# Patient Record
Sex: MALE | Race: BLACK OR AFRICAN AMERICAN | Employment: FULL TIME | URBAN - METROPOLITAN AREA
[De-identification: names, ages, dates, MRNs, and addresses within clinical notes are randomized per-mention and may not be internally consistent; named-entity substitution may affect disease eponyms.]

---

## 2022-12-06 ENCOUNTER — HOSPITAL ENCOUNTER (EMERGENCY)
Age: 22
Discharge: HOME | End: 2022-12-06
Attending: EMERGENCY MEDICINE
Payer: MEDICAID

## 2022-12-06 VITALS
SYSTOLIC BLOOD PRESSURE: 152 MMHG | HEIGHT: 73 IN | WEIGHT: 200 LBS | DIASTOLIC BLOOD PRESSURE: 96 MMHG | TEMPERATURE: 98.3 F | HEART RATE: 67 BPM | BODY MASS INDEX: 26.51 KG/M2 | OXYGEN SATURATION: 97 % | RESPIRATION RATE: 17 BRPM

## 2022-12-06 DIAGNOSIS — U07.1 COVID-19: Primary | ICD-10-CM

## 2022-12-06 LAB
FLUAV AG NPH QL IA: NEGATIVE
FLUBV AG NPH QL IA: NEGATIVE
SARS-COV-2 RDRP RESP QL NAA+PROBE: DETECTED
SOURCE: ABNORMAL
SPECIMEN SOURCE: NORMAL

## 2022-12-06 PROCEDURE — 87804 INFLUENZA ASSAY W/OPTIC: CPT

## 2022-12-06 PROCEDURE — 87635 SARS-COV-2 COVID-19 AMP PRB: CPT

## 2022-12-06 PROCEDURE — 99283 EMERGENCY DEPT VISIT LOW MDM: CPT | Performed by: EMERGENCY MEDICINE

## 2022-12-06 RX ORDER — ONDANSETRON 4 MG/1
4 TABLET, ORALLY DISINTEGRATING ORAL 3 TIMES DAILY PRN
Qty: 9 TABLET | Refills: 0 | Status: SHIPPED | OUTPATIENT
Start: 2022-12-06 | End: 2022-12-09

## 2022-12-06 RX ORDER — IBUPROFEN 600 MG/1
600 TABLET ORAL 3 TIMES DAILY
Qty: 15 TABLET | Refills: 0 | Status: SHIPPED | OUTPATIENT
Start: 2022-12-06 | End: 2022-12-11

## 2022-12-06 ASSESSMENT — PAIN DESCRIPTION - LOCATION: LOCATION: THROAT;HEAD

## 2022-12-06 ASSESSMENT — PAIN - FUNCTIONAL ASSESSMENT: PAIN_FUNCTIONAL_ASSESSMENT: 0-10

## 2022-12-06 ASSESSMENT — PAIN SCALES - GENERAL: PAINLEVEL_OUTOF10: 6

## 2022-12-06 NOTE — ED NOTES
I have reviewed discharge instructions with the patient. The patient and spouse verbalized understanding. Patient left ED via Discharge Method: ambulatory to Home with girlfriend     Opportunity for questions and clarification provided. Patient given 3 scripts. To continue your aftercare when you leave the hospital, you may receive an automated call from our care team to check in on how you are doing. This is a free service and part of our promise to provide the best care and service to meet your aftercare needs.  If you have questions, or wish to unsubscribe from this service please call [phone]. Thank you for Choosing our [facility] Emergency Department.         Miguel Pantoja RN  12/06/22 [id number]

## 2022-12-06 NOTE — ED PROVIDER NOTES
Emergency Department Provider Note                   PCP:                No primary care provider on file. Age: 25 y.o. Sex: male     Final diagnosis/impression:  1. COVID-19         Disposition: Discharge    MDM/Clinical Course:  Patient seen by myself here in the [place] [address] emergency department. Patient had signs, symptoms and clinical history most consistent with viral type symptoms as previously described, well-appearing. Labs and radiology ordered, notable for COVID-19 viral infection. Outpatient prescriptions for Paxil bid given lack of previous vaccination, Zofran ODT, ibuprofen written. Recommended over-the-counter use of guaifenesin, Tylenol as needed, aggressive p.o. fluid hydration, close monitoring of symptoms, self-isolation. Patient/family given instructions to return as needed for any questions, concerns or worsening symptoms, particularly those as outlined in the disposition section / discharge section of patient discharge paperwork. Patient/family verbalizes understanding agreement with ED course/plan. Questions answered. Work note written. Patient with mild elevation of blood pressure here, may be related to viral illness, ER visit or use of Tylenol Cold and flu which contains phenylephrine. Recommended avoiding phenylephrine containing medications given increased risk for hypertension and other undesirable side effects. Recommend replacement with Tylenol, Mucinex. HPI: Demetri Berkowitz is a 25 y.o. male who denies pertinent past medical presenting for evaluation of viral type symptoms. Patient notes 2-day history of headache, fever/chills, myalgias, nausea but no vomiting, cough, very minimal sore throat particular with cough. Has been taking Tylenol Cold and flu for symptom relief. Denies near syncope, chest pain, shortness of breath symptoms. Denies any diarrheal symptoms or loose stools. No known sick contacts.  Patient/family denies any other evaluation for today's acute complaint. Patient/family denies any other aggravating or alleviating factors. Patient/family denies any other symptoms. Patient has not received any vaccination against COVID-19 per patient report. ROS:   All review of systems negative except as noted above in the history of the present illness. Past Medical/ Family/ Social History:     Medical history: History reviewed. No pertinent past medical history. Patient denies pertinent medical history  Surgical history: History reviewed. No pertinent surgical history. Patient denies pertinent surgical history  Family history: History reviewed. No pertinent family history. Patient denies pertinent family medical history  Social history:   Social History     Tobacco Use    Smoking status: Every Day     Packs/day: 0.50     Types: Cigarettes    Smokeless tobacco: Never        Medications:   Previous Medications    No medications on file        Allergies: No Known Allergies      Physical Exam     Vitals signs reviewed.    Patient Vitals for the past 4 hrs:   Temp Pulse Resp BP SpO2   12/06/22 1029 98.3 °F (36.8 °C) 70 17 (!) 159/108 98 %       General: Alert and oriented ×4, no acute distress   Eyes: Anicteric, conjunctiva pink, PERRLA, EOMI  ENT: No nasal discharge, there is nasal congestion present  Pulmonary: Clear to auscultation bilaterally with symmetric chest rise, no increased work of breathing, no accessory muscle use  Cardiovascular: Regular rate and rhythm, no rub or gallop appreciated on my exam  GI: Abdomen is soft, nontender, nondistended, bowel sounds are present  Musculoskeletal: No obvious joint deformity or joint effusion, normal joint range of motion  Neuro: Cranial nerves II through VII grossly intact, strength and sensation is grossly intact in the upper and lower extremities bilaterally  Skin: Skin is warm and dry    Procedures        Results for orders placed or performed during the hospital encounter of 12/06/22   COVID-19, Rapid    Specimen: Nasopharyngeal   Result Value Ref Range    Source Nasopharyngeal      SARS-CoV-2, Rapid Detected (A) NOTD     Rapid influenza A/B antigens    Specimen: Nasal Washing   Result Value Ref Range    Influenza A Ag Negative NEG      Influenza B Ag Negative NEG      Source Nasopharyngeal          No orders to display                     Voice dictation software was used during the making of this note. This software is not perfect and grammatical and other typographical errors may be present. This note has not been completely proofread for errors.      Brenda Beasley MD  12/06/22 Parisa Stone MD  12/06/22 [id number]

## 2022-12-06 NOTE — DISCHARGE INSTRUCTIONS
Please purchase or borrow a pulse oximeter, this is a device that measures your oxygen levels and is worn on your finger. If your oxygen levels are staying near or below 90% for any extended amount of time, please return to the ER for repeat/further evaluation. Please return for any questions, concerns or worsening symptoms, particularly increasing shortness of breath, persistent wheezing or difficulty breathing, increasing chest pain, passing out episodes, intractable nausea/vomiting, intractable diarrhea, lethargy, ill appearance.